# Patient Record
Sex: MALE | Race: WHITE | NOT HISPANIC OR LATINO | Employment: OTHER | URBAN - METROPOLITAN AREA
[De-identification: names, ages, dates, MRNs, and addresses within clinical notes are randomized per-mention and may not be internally consistent; named-entity substitution may affect disease eponyms.]

---

## 2017-12-20 ENCOUNTER — ESTABLISHED PATIENT (OUTPATIENT)
Dept: URBAN - METROPOLITAN AREA CLINIC 36 | Facility: CLINIC | Age: 72
End: 2017-12-20

## 2017-12-20 DIAGNOSIS — H35.413: ICD-10-CM

## 2017-12-20 DIAGNOSIS — H35.3130: ICD-10-CM

## 2017-12-20 PROCEDURE — 92014 COMPRE OPH EXAM EST PT 1/>: CPT

## 2017-12-20 PROCEDURE — G8427 DOCREV CUR MEDS BY ELIG CLIN: HCPCS

## 2017-12-20 PROCEDURE — 92134 CPTRZ OPH DX IMG PST SGM RTA: CPT

## 2017-12-20 PROCEDURE — 2019F DILATED MACUL EXAM DONE: CPT

## 2017-12-20 PROCEDURE — 1036F TOBACCO NON-USER: CPT

## 2017-12-20 PROCEDURE — 92242 FLUORESCEIN&ICG ANGIOGRAPHY: CPT

## 2017-12-20 PROCEDURE — 4177F TALK PT/CRGVR RE AREDS PREV: CPT

## 2017-12-20 ASSESSMENT — VISUAL ACUITY
OS_CC: 20/20
OD_CC: J1
OS_CC: J1+
OD_CC: 20/20-1

## 2017-12-20 ASSESSMENT — TONOMETRY
OD_IOP_MMHG: 17
OS_IOP_MMHG: 15

## 2018-12-03 ENCOUNTER — NEW PATIENT COMPREHENSIVE (OUTPATIENT)
Dept: URBAN - METROPOLITAN AREA CLINIC 36 | Facility: CLINIC | Age: 73
End: 2018-12-03

## 2018-12-03 VITALS — HEIGHT: 60 IN | DIASTOLIC BLOOD PRESSURE: 63 MMHG | HEART RATE: 65 BPM | SYSTOLIC BLOOD PRESSURE: 113 MMHG

## 2018-12-03 DIAGNOSIS — H43.813: ICD-10-CM

## 2018-12-03 DIAGNOSIS — H33.322: ICD-10-CM

## 2018-12-03 DIAGNOSIS — H35.3131: ICD-10-CM

## 2018-12-03 DIAGNOSIS — H35.413: ICD-10-CM

## 2018-12-03 PROCEDURE — 2019F DILATED MACUL EXAM DONE: CPT

## 2018-12-03 PROCEDURE — G9903 PT SCRN TBCO ID AS NON USER: HCPCS

## 2018-12-03 PROCEDURE — G8754 DIAS BP LESS 90: HCPCS

## 2018-12-03 PROCEDURE — 4177F TALK PT/CRGVR RE AREDS PREV: CPT

## 2018-12-03 PROCEDURE — 1036F TOBACCO NON-USER: CPT

## 2018-12-03 PROCEDURE — 92014 COMPRE OPH EXAM EST PT 1/>: CPT

## 2018-12-03 PROCEDURE — 92134 CPTRZ OPH DX IMG PST SGM RTA: CPT

## 2018-12-03 PROCEDURE — 9222550 BILAT EXTENDED OPHTHALMOSCOPY, FIRST

## 2018-12-03 PROCEDURE — G8427 DOCREV CUR MEDS BY ELIG CLIN: HCPCS

## 2018-12-03 PROCEDURE — G8752 SYS BP LESS 140: HCPCS

## 2018-12-03 ASSESSMENT — VISUAL ACUITY
OS_CC: 20/25-2
OD_CC: J1
OS_CC: J1
OD_CC: 20/25+2

## 2018-12-03 ASSESSMENT — TONOMETRY
OD_IOP_MMHG: 16
OS_IOP_MMHG: 16

## 2019-03-20 ENCOUNTER — APPOINTMENT (RX ONLY)
Dept: URBAN - METROPOLITAN AREA CLINIC 150 | Facility: CLINIC | Age: 74
Setting detail: DERMATOLOGY
End: 2019-03-20

## 2019-03-20 DIAGNOSIS — D22 MELANOCYTIC NEVI: ICD-10-CM

## 2019-03-20 DIAGNOSIS — L81.4 OTHER MELANIN HYPERPIGMENTATION: ICD-10-CM

## 2019-03-20 DIAGNOSIS — D18.0 HEMANGIOMA: ICD-10-CM

## 2019-03-20 DIAGNOSIS — L82.1 OTHER SEBORRHEIC KERATOSIS: ICD-10-CM

## 2019-03-20 DIAGNOSIS — L57.0 ACTINIC KERATOSIS: ICD-10-CM

## 2019-03-20 DIAGNOSIS — L28.0 LICHEN SIMPLEX CHRONICUS: ICD-10-CM

## 2019-03-20 PROBLEM — D48.5 NEOPLASM OF UNCERTAIN BEHAVIOR OF SKIN: Status: ACTIVE | Noted: 2019-03-20

## 2019-03-20 PROBLEM — Z85.828 PERSONAL HISTORY OF OTHER MALIGNANT NEOPLASM OF SKIN: Status: ACTIVE | Noted: 2019-03-20

## 2019-03-20 PROBLEM — Z85.820 PERSONAL HISTORY OF MALIGNANT MELANOMA OF SKIN: Status: ACTIVE | Noted: 2019-03-20

## 2019-03-20 PROBLEM — L23.7 ALLERGIC CONTACT DERMATITIS DUE TO PLANTS, EXCEPT FOOD: Status: ACTIVE | Noted: 2019-03-20

## 2019-03-20 PROBLEM — D18.01 HEMANGIOMA OF SKIN AND SUBCUTANEOUS TISSUE: Status: ACTIVE | Noted: 2019-03-20

## 2019-03-20 PROCEDURE — ? BIOPSY BY SHAVE METHOD

## 2019-03-20 PROCEDURE — ? COUNSELING

## 2019-03-20 PROCEDURE — 11102 TANGNTL BX SKIN SINGLE LES: CPT

## 2019-03-20 PROCEDURE — ? LIQUID NITROGEN

## 2019-03-20 PROCEDURE — 17003 DESTRUCT PREMALG LES 2-14: CPT

## 2019-03-20 PROCEDURE — ? PRESCRIPTION

## 2019-03-20 PROCEDURE — 99202 OFFICE O/P NEW SF 15 MIN: CPT | Mod: 25

## 2019-03-20 PROCEDURE — 17000 DESTRUCT PREMALG LESION: CPT | Mod: 59

## 2019-03-20 PROCEDURE — 11103 TANGNTL BX SKIN EA SEP/ADDL: CPT

## 2019-03-20 RX ORDER — CLOBETASOL PROPIONATE 0.5 MG/G
CREAM TOPICAL BID
Qty: 1 | Refills: 3 | Status: CANCELLED

## 2019-03-20 ASSESSMENT — LOCATION SIMPLE DESCRIPTION DERM
LOCATION SIMPLE: LEFT UPPER BACK
LOCATION SIMPLE: LEFT THIGH
LOCATION SIMPLE: RIGHT FOREARM
LOCATION SIMPLE: RIGHT UPPER ARM
LOCATION SIMPLE: LEFT CHEEK
LOCATION SIMPLE: RIGHT SHOULDER
LOCATION SIMPLE: RIGHT THIGH
LOCATION SIMPLE: LEFT UPPER ARM
LOCATION SIMPLE: RIGHT CHEEK
LOCATION SIMPLE: LEFT CALF
LOCATION SIMPLE: LEFT SHOULDER
LOCATION SIMPLE: LEFT FOREARM
LOCATION SIMPLE: RIGHT PRETIBIAL REGION
LOCATION SIMPLE: RIGHT HAND
LOCATION SIMPLE: LEFT HAND
LOCATION SIMPLE: CHEST
LOCATION SIMPLE: ABDOMEN
LOCATION SIMPLE: RIGHT CALF
LOCATION SIMPLE: LEFT POSTERIOR THIGH

## 2019-03-20 ASSESSMENT — LOCATION DETAILED DESCRIPTION DERM
LOCATION DETAILED: LEFT PROXIMAL POSTERIOR UPPER ARM
LOCATION DETAILED: RIGHT ANTERIOR DISTAL THIGH
LOCATION DETAILED: LEFT INFERIOR UPPER BACK
LOCATION DETAILED: LEFT SUPERIOR LATERAL BUCCAL CHEEK
LOCATION DETAILED: RIGHT POSTERIOR SHOULDER
LOCATION DETAILED: LEFT POSTERIOR SHOULDER
LOCATION DETAILED: LEFT ULNAR DORSAL HAND
LOCATION DETAILED: LEFT ANTERIOR DISTAL THIGH
LOCATION DETAILED: PERIUMBILICAL SKIN
LOCATION DETAILED: RIGHT PROXIMAL PRETIBIAL REGION
LOCATION DETAILED: RIGHT MEDIAL SUPERIOR CHEST
LOCATION DETAILED: RIGHT PROXIMAL POSTERIOR UPPER ARM
LOCATION DETAILED: RIGHT RADIAL DORSAL HAND
LOCATION DETAILED: RIGHT PROXIMAL CALF
LOCATION DETAILED: EPIGASTRIC SKIN
LOCATION DETAILED: RIGHT CENTRAL MALAR CHEEK
LOCATION DETAILED: RIGHT PROXIMAL DORSAL FOREARM
LOCATION DETAILED: LEFT DISTAL DORSAL FOREARM
LOCATION DETAILED: RIGHT INFERIOR CENTRAL MALAR CHEEK
LOCATION DETAILED: LEFT DISTAL POSTERIOR THIGH
LOCATION DETAILED: LEFT PROXIMAL CALF
LOCATION DETAILED: LEFT INFERIOR CENTRAL MALAR CHEEK

## 2019-03-20 ASSESSMENT — LOCATION ZONE DERM
LOCATION ZONE: ARM
LOCATION ZONE: HAND
LOCATION ZONE: TRUNK
LOCATION ZONE: LEG
LOCATION ZONE: FACE

## 2019-03-20 NOTE — PROCEDURE: BIOPSY BY SHAVE METHOD
Additional Anesthesia Volume In Cc (Will Not Render If 0): 0
Wound Care: Petrolatum
Electrodesiccation Text: The wound bed was treated with electrodesiccation after the biopsy was performed.
Depth Of Biopsy: dermis
Bill 57794 For Specimen Handling/Conveyance To Laboratory?: no
Biopsy Type: H and E
Type Of Destruction Used: Curettage
Electrodesiccation And Curettage Text: The wound bed was treated with electrodesiccation and curettage after the biopsy was performed.
Billing Type: Third-Party Bill
Post-Care Instructions: I reviewed with the patient in detail post-care instructions. Patient is to keep the biopsy site dry overnight, and then apply bacitracin twice daily until healed. Patient may apply hydrogen peroxide soaks to remove any crusting.
Anesthesia Volume In Cc (Will Not Render If 0): 0.5
Dressing: bandage
Was A Bandage Applied: Yes
Silver Nitrate Text: The wound bed was treated with silver nitrate after the biopsy was performed.
Notification Instructions: Patient will be notified of biopsy results. However, patient instructed to call the office if not contacted within 2 weeks.
Hemostasis: Drysol
Detail Level: Detailed
Curettage Text: The wound bed was treated with curettage after the biopsy was performed.
Biopsy Method: Dermablade
Lab: [id number]
Consent: Verbal consent was obtained and risks were reviewed including but not limited to scarring, infection, bleeding, scabbing, incomplete removal, nerve damage and allergy to anesthesia.
Cryotherapy Text: The wound bed was treated with cryotherapy after the biopsy was performed.
Anesthesia Type: 1% lidocaine with epinephrine

## 2019-03-25 ENCOUNTER — RX ONLY (OUTPATIENT)
Age: 74
Setting detail: RX ONLY
End: 2019-03-25

## 2019-03-25 RX ORDER — CLOBETASOL PROPIONATE 0.5 MG/G
CREAM TOPICAL
Qty: 1 | Refills: 2 | Status: ERX | COMMUNITY
Start: 2019-03-25

## 2019-04-17 ENCOUNTER — APPOINTMENT (RX ONLY)
Dept: URBAN - METROPOLITAN AREA CLINIC 150 | Facility: CLINIC | Age: 74
Setting detail: DERMATOLOGY
End: 2019-04-17

## 2019-04-17 PROBLEM — D04.62 CARCINOMA IN SITU OF SKIN OF LEFT UPPER LIMB, INCLUDING SHOULDER: Status: ACTIVE | Noted: 2019-04-17

## 2019-04-17 PROBLEM — C44.619 BASAL CELL CARCINOMA OF SKIN OF LEFT UPPER LIMB, INCLUDING SHOULDER: Status: ACTIVE | Noted: 2019-04-17

## 2019-04-17 PROBLEM — H91.90 UNSPECIFIED HEARING LOSS, UNSPECIFIED EAR: Status: ACTIVE | Noted: 2019-04-17

## 2019-04-17 PROCEDURE — ? PRESCRIPTION

## 2019-04-17 PROCEDURE — ? CURETTAGE AND DESTRUCTION

## 2019-04-17 PROCEDURE — 17261 DSTRJ MAL LES T/A/L .6-1.0CM: CPT

## 2019-04-17 PROCEDURE — 17262 DSTRJ MAL LES T/A/L 1.1-2.0: CPT

## 2019-04-17 PROCEDURE — ? COUNSELING

## 2019-04-17 RX ORDER — DOXYCYCLINE HYCLATE 100 MG/1
CAPSULE, GELATIN COATED ORAL BID
Qty: 10 | Refills: 0 | Status: ERX | COMMUNITY
Start: 2019-04-17

## 2019-04-17 RX ADMIN — DOXYCYCLINE HYCLATE: 100 CAPSULE, GELATIN COATED ORAL at 15:49

## 2019-05-16 ENCOUNTER — APPOINTMENT (RX ONLY)
Dept: URBAN - METROPOLITAN AREA CLINIC 151 | Facility: CLINIC | Age: 74
Setting detail: DERMATOLOGY
End: 2019-05-16

## 2019-05-16 DIAGNOSIS — Z86.007 PERSONAL HISTORY OF IN-SITU NEOPLASM OF SKIN: ICD-10-CM

## 2019-05-16 DIAGNOSIS — Z85.828 PERSONAL HISTORY OF OTHER MALIGNANT NEOPLASM OF SKIN: ICD-10-CM

## 2019-05-16 PROCEDURE — ? COUNSELING

## 2019-05-16 PROCEDURE — 99213 OFFICE O/P EST LOW 20 MIN: CPT

## 2019-05-16 ASSESSMENT — LOCATION SIMPLE DESCRIPTION DERM
LOCATION SIMPLE: LEFT UPPER ARM
LOCATION SIMPLE: LEFT FOREARM

## 2019-05-16 ASSESSMENT — LOCATION DETAILED DESCRIPTION DERM
LOCATION DETAILED: LEFT LATERAL PROXIMAL UPPER ARM
LOCATION DETAILED: LEFT PROXIMAL DORSAL FOREARM

## 2019-05-16 ASSESSMENT — LOCATION ZONE DERM
LOCATION ZONE: ARM
LOCATION ZONE: ARM

## 2019-07-25 NOTE — PATIENT DISCUSSION
Recommended excision/biopsy due to suspicious appearance.  Patient desires to proceed. Photos taken.  Consent form signed. We will call with path.

## 2019-12-02 ENCOUNTER — ESTABLISHED PATIENT (OUTPATIENT)
Dept: URBAN - METROPOLITAN AREA CLINIC 36 | Facility: CLINIC | Age: 74
End: 2019-12-02

## 2019-12-02 DIAGNOSIS — H35.3131: ICD-10-CM

## 2019-12-02 DIAGNOSIS — H35.363: ICD-10-CM

## 2019-12-02 DIAGNOSIS — H43.813: ICD-10-CM

## 2019-12-02 DIAGNOSIS — H33.322: ICD-10-CM

## 2019-12-02 DIAGNOSIS — H35.413: ICD-10-CM

## 2019-12-02 PROCEDURE — 92014 COMPRE OPH EXAM EST PT 1/>: CPT

## 2019-12-02 PROCEDURE — 92134 CPTRZ OPH DX IMG PST SGM RTA: CPT

## 2019-12-02 PROCEDURE — 9222650 BILAT EXTENDED OPHTHALMOSCOPY, F/U

## 2019-12-02 ASSESSMENT — VISUAL ACUITY
OS_CC: 20/30-2+2
OD_CC: 20/25-2

## 2019-12-02 ASSESSMENT — TONOMETRY
OS_IOP_MMHG: 16
OD_IOP_MMHG: 15

## 2020-10-01 ENCOUNTER — OFFICE VISIT (OUTPATIENT)
Age: 75
End: 2020-10-01

## 2020-10-19 ENCOUNTER — ESTABLISHED COMPREHENSIVE EXAM (OUTPATIENT)
Dept: URBAN - METROPOLITAN AREA CLINIC 36 | Facility: CLINIC | Age: 75
End: 2020-10-19

## 2020-10-19 VITALS — HEIGHT: 60 IN

## 2020-10-19 DIAGNOSIS — H25.813: ICD-10-CM

## 2020-10-19 DIAGNOSIS — H33.322: ICD-10-CM

## 2020-10-19 DIAGNOSIS — H43.813: ICD-10-CM

## 2020-10-19 DIAGNOSIS — H35.413: ICD-10-CM

## 2020-10-19 DIAGNOSIS — H35.363: ICD-10-CM

## 2020-10-19 DIAGNOSIS — H35.3131: ICD-10-CM

## 2020-10-19 PROCEDURE — 92202 OPSCPY EXTND ON/MAC DRAW: CPT

## 2020-10-19 PROCEDURE — 92014 COMPRE OPH EXAM EST PT 1/>: CPT

## 2020-10-19 PROCEDURE — 92134 CPTRZ OPH DX IMG PST SGM RTA: CPT

## 2020-10-19 ASSESSMENT — VISUAL ACUITY
OS_CC: 20/50+1
OS_PH: 20/40
OS_CC: J3
OD_CC: 20/25+2
OD_CC: J1

## 2020-10-19 ASSESSMENT — TONOMETRY
OD_IOP_MMHG: 16
OS_IOP_MMHG: 17

## 2021-01-01 ENCOUNTER — OFFICE VISIT (OUTPATIENT)
Age: 76
End: 2021-01-01

## 2021-03-30 ENCOUNTER — OFFICE VISIT (OUTPATIENT)
Dept: URBAN - METROPOLITAN AREA CLINIC 7 | Facility: CLINIC | Age: 76
End: 2021-03-30

## 2021-04-07 ENCOUNTER — TELEPHONE ENCOUNTER (OUTPATIENT)
Dept: URBAN - METROPOLITAN AREA CLINIC 9 | Facility: CLINIC | Age: 76
End: 2021-04-07

## 2021-04-19 ENCOUNTER — ESTABLISHED COMPREHENSIVE EXAM (OUTPATIENT)
Dept: URBAN - METROPOLITAN AREA CLINIC 36 | Facility: CLINIC | Age: 76
End: 2021-04-19

## 2021-04-19 DIAGNOSIS — H25.813: ICD-10-CM

## 2021-04-19 DIAGNOSIS — H35.3131: ICD-10-CM

## 2021-04-19 DIAGNOSIS — H43.813: ICD-10-CM

## 2021-04-19 DIAGNOSIS — H35.363: ICD-10-CM

## 2021-04-19 DIAGNOSIS — H33.322: ICD-10-CM

## 2021-04-19 DIAGNOSIS — H35.413: ICD-10-CM

## 2021-04-19 PROCEDURE — 92202 OPSCPY EXTND ON/MAC DRAW: CPT

## 2021-04-19 PROCEDURE — 92012 INTRM OPH EXAM EST PATIENT: CPT

## 2021-04-19 ASSESSMENT — VISUAL ACUITY
OS_CC: 20/50+2
OS_PH: 20/30
OD_CC: 20/20-2

## 2021-04-19 ASSESSMENT — TONOMETRY
OD_IOP_MMHG: 11
OS_IOP_MMHG: 11

## 2021-05-13 ENCOUNTER — TELEPHONE ENCOUNTER (OUTPATIENT)
Dept: URBAN - METROPOLITAN AREA CLINIC 9 | Facility: CLINIC | Age: 76
End: 2021-05-13

## 2021-06-21 ENCOUNTER — CATARACT CONSULT (OUTPATIENT)
Dept: URBAN - METROPOLITAN AREA CLINIC 43 | Facility: CLINIC | Age: 76
End: 2021-06-21

## 2021-06-21 DIAGNOSIS — H25.811: ICD-10-CM

## 2021-06-21 DIAGNOSIS — H35.3131: ICD-10-CM

## 2021-06-21 DIAGNOSIS — H35.363: ICD-10-CM

## 2021-06-21 DIAGNOSIS — H25.812: ICD-10-CM

## 2021-06-21 DIAGNOSIS — H35.413: ICD-10-CM

## 2021-06-21 DIAGNOSIS — H43.813: ICD-10-CM

## 2021-06-21 DIAGNOSIS — H33.322: ICD-10-CM

## 2021-06-21 PROCEDURE — 92025-1 CORNEAL TOPOGRAPHY, INS

## 2021-06-21 PROCEDURE — 92286 ANT SGM IMG I&R SPECLR MIC: CPT

## 2021-06-21 PROCEDURE — 92134 CPTRZ OPH DX IMG PST SGM RTA: CPT

## 2021-06-21 PROCEDURE — V2799PMN IMPRIMIS PRED-MOXI-NEPAF 5ML

## 2021-06-21 PROCEDURE — 92136TC INTERFEROMETRY - TECHNICAL COMPONENT

## 2021-06-21 PROCEDURE — 92014 COMPRE OPH EXAM EST PT 1/>: CPT

## 2021-06-21 PROCEDURE — 92250 FUNDUS PHOTOGRAPHY W/I&R: CPT

## 2021-06-21 ASSESSMENT — VISUAL ACUITY
OS_BAT: 20/400
OD_SC: 20/200
OS_CC: J2
OS_SC: J2
OS_SC: 20/400
OD_CC: 20/25-2
OD_BAT: 20/200
OS_CC: 20/60-2
OS_PH: 20/60
OD_CC: J1
OD_SC: J1

## 2021-06-21 ASSESSMENT — TONOMETRY
OS_IOP_MMHG: 20
OD_IOP_MMHG: 14

## 2021-07-15 ENCOUNTER — SURGERY/PROCEDURE (OUTPATIENT)
Dept: URBAN - METROPOLITAN AREA CLINIC 43 | Facility: CLINIC | Age: 76
End: 2021-07-15

## 2021-07-15 ENCOUNTER — ESTABLISHED PATIENT (OUTPATIENT)
Dept: URBAN - METROPOLITAN AREA SURGERY 14 | Facility: SURGERY | Age: 76
End: 2021-07-15

## 2021-07-15 DIAGNOSIS — H35.363: ICD-10-CM

## 2021-07-15 DIAGNOSIS — H35.3131: ICD-10-CM

## 2021-07-15 DIAGNOSIS — H25.812: ICD-10-CM

## 2021-07-15 DIAGNOSIS — H35.413: ICD-10-CM

## 2021-07-15 DIAGNOSIS — H43.813: ICD-10-CM

## 2021-07-15 DIAGNOSIS — H33.322: ICD-10-CM

## 2021-07-15 DIAGNOSIS — H25.811: ICD-10-CM

## 2021-07-15 PROCEDURE — 66999LNSR LENSAR LASER FOR CAT SX

## 2021-07-15 PROCEDURE — 99211HP H&P OFFICE/OUTPATIENT VISIT, EST

## 2021-07-15 PROCEDURE — 66984CV REMOVE CATARACT, INSERT LENS, CUSTOM VISION

## 2021-07-16 ENCOUNTER — CATARACT POST-OP 1-DAY (OUTPATIENT)
Dept: URBAN - METROPOLITAN AREA CLINIC 36 | Facility: CLINIC | Age: 76
End: 2021-07-16

## 2021-07-16 DIAGNOSIS — Z96.1: ICD-10-CM

## 2021-07-16 PROCEDURE — 99024 POSTOP FOLLOW-UP VISIT: CPT

## 2021-07-16 ASSESSMENT — VISUAL ACUITY
OD_CC: 20/20-2
OS_SC: 20/50-2

## 2021-07-16 ASSESSMENT — TONOMETRY: OS_IOP_MMHG: 22

## 2021-07-23 ENCOUNTER — POST-OP (OUTPATIENT)
Dept: URBAN - METROPOLITAN AREA CLINIC 36 | Facility: CLINIC | Age: 76
End: 2021-07-23

## 2021-07-23 DIAGNOSIS — Z96.1: ICD-10-CM

## 2021-07-23 PROCEDURE — 99024 POSTOP FOLLOW-UP VISIT: CPT

## 2021-07-23 ASSESSMENT — VISUAL ACUITY
OS_SC: 20/30
OS_SC: J12
OD_SC: 20/200
OD_CC: J1

## 2021-09-03 ENCOUNTER — POST-OP (OUTPATIENT)
Dept: URBAN - METROPOLITAN AREA CLINIC 36 | Facility: CLINIC | Age: 76
End: 2021-09-03

## 2021-09-03 DIAGNOSIS — H35.413: ICD-10-CM

## 2021-09-03 DIAGNOSIS — H25.811: ICD-10-CM

## 2021-09-03 DIAGNOSIS — H33.322: ICD-10-CM

## 2021-09-03 DIAGNOSIS — H25.812: ICD-10-CM

## 2021-09-03 DIAGNOSIS — Z96.1: ICD-10-CM

## 2021-09-03 DIAGNOSIS — H43.813: ICD-10-CM

## 2021-09-03 DIAGNOSIS — H35.3131: ICD-10-CM

## 2021-09-03 DIAGNOSIS — H35.363: ICD-10-CM

## 2022-03-28 ENCOUNTER — COMPREHENSIVE EXAM (OUTPATIENT)
Dept: URBAN - METROPOLITAN AREA CLINIC 36 | Facility: CLINIC | Age: 77
End: 2022-03-28

## 2022-03-28 DIAGNOSIS — H43.813: ICD-10-CM

## 2022-03-28 DIAGNOSIS — H35.363: ICD-10-CM

## 2022-03-28 DIAGNOSIS — H35.413: ICD-10-CM

## 2022-03-28 DIAGNOSIS — H35.3131: ICD-10-CM

## 2022-03-28 DIAGNOSIS — H25.811: ICD-10-CM

## 2022-03-28 DIAGNOSIS — H33.322: ICD-10-CM

## 2022-03-28 PROCEDURE — 92134 CPTRZ OPH DX IMG PST SGM RTA: CPT

## 2022-03-28 PROCEDURE — 92202 OPSCPY EXTND ON/MAC DRAW: CPT

## 2022-03-28 PROCEDURE — 92014 COMPRE OPH EXAM EST PT 1/>: CPT

## 2022-03-28 ASSESSMENT — VISUAL ACUITY
OD_CC: J1
OS_CC: J2
OS_CC: 20/30-2
OD_CC: 20/20-2

## 2022-03-28 ASSESSMENT — TONOMETRY
OS_IOP_MMHG: 20
OD_IOP_MMHG: 19

## 2022-07-30 ENCOUNTER — TELEPHONE ENCOUNTER (OUTPATIENT)
Age: 77
End: 2022-07-30

## 2022-07-30 RX ORDER — NEOMYCIN SULFATE 500 MG
1 (ONE) TABLET ORAL
Qty: 0 | Refills: 2 | OUTPATIENT
Start: 2021-05-11 | End: 2021-05-25

## 2022-07-31 ENCOUNTER — TELEPHONE ENCOUNTER (OUTPATIENT)
Age: 77
End: 2022-07-31

## 2022-07-31 RX ORDER — NEOMYCIN SULFATE 500 MG
1 (ONE) TABLET ORAL
Qty: 0 | Refills: 2 | Status: ACTIVE | COMMUNITY
Start: 2021-05-11

## 2022-12-27 NOTE — PATIENT DISCUSSION
"""Annual Type 2 Diabetic eye exam with dilation. No diabetic retinopathy found. Recommend annual dilated examinations. Patient instructed to call office immediately if sudden changes in vision occur. Emphasized importance of good blood glucose control. Summary of care provided to the physician managing the ongoing diabetes care. """ Surgery

## 2023-04-06 ENCOUNTER — EMERGENCY VISIT (OUTPATIENT)
Dept: URBAN - METROPOLITAN AREA CLINIC 36 | Facility: CLINIC | Age: 78
End: 2023-04-06

## 2023-04-06 DIAGNOSIS — H01.02A: ICD-10-CM

## 2023-04-06 DIAGNOSIS — H01.02B: ICD-10-CM

## 2023-04-06 DIAGNOSIS — H11.31: ICD-10-CM

## 2023-04-06 PROCEDURE — 92012 INTRM OPH EXAM EST PATIENT: CPT

## 2023-04-06 ASSESSMENT — TONOMETRY
OD_IOP_MMHG: 14
OS_IOP_MMHG: 16

## 2023-04-06 ASSESSMENT — VISUAL ACUITY
OD_CC: 20/30
OS_CC: 20/25

## 2023-05-10 ENCOUNTER — PREPPED CHART (OUTPATIENT)
Dept: URBAN - METROPOLITAN AREA CLINIC 36 | Facility: CLINIC | Age: 78
End: 2023-05-10

## 2023-10-12 ENCOUNTER — COMPREHENSIVE EXAM (OUTPATIENT)
Dept: URBAN - METROPOLITAN AREA CLINIC 36 | Facility: CLINIC | Age: 78
End: 2023-10-12

## 2023-10-12 DIAGNOSIS — H35.3121: ICD-10-CM

## 2023-10-12 DIAGNOSIS — H33.322: ICD-10-CM

## 2023-10-12 DIAGNOSIS — H25.811: ICD-10-CM

## 2023-10-12 DIAGNOSIS — H35.3211: ICD-10-CM

## 2023-10-12 DIAGNOSIS — H04.123: ICD-10-CM

## 2023-10-12 PROCEDURE — 92015 DETERMINE REFRACTIVE STATE: CPT

## 2023-10-12 PROCEDURE — 92134 CPTRZ OPH DX IMG PST SGM RTA: CPT

## 2023-10-12 PROCEDURE — 92014 COMPRE OPH EXAM EST PT 1/>: CPT

## 2023-10-12 ASSESSMENT — TONOMETRY
OD_IOP_MMHG: 18
OS_IOP_MMHG: 17

## 2023-10-12 ASSESSMENT — VISUAL ACUITY
OS_SC: 20/25-1
OS_CC: 20/25-2
OD_CC: 20/30-2
OD_SC: J1+
OS_SC: >J10
OD_SC: 20/400

## 2024-10-21 ENCOUNTER — COMPREHENSIVE EXAM (OUTPATIENT)
Dept: URBAN - METROPOLITAN AREA CLINIC 36 | Facility: CLINIC | Age: 79
End: 2024-10-21

## 2024-10-21 DIAGNOSIS — H04.123: ICD-10-CM

## 2024-10-21 DIAGNOSIS — H25.811: ICD-10-CM

## 2024-10-21 DIAGNOSIS — H33.322: ICD-10-CM

## 2024-10-21 DIAGNOSIS — H35.3121: ICD-10-CM

## 2024-10-21 DIAGNOSIS — H52.7: ICD-10-CM

## 2024-10-21 DIAGNOSIS — H35.3211: ICD-10-CM

## 2024-10-21 PROCEDURE — 92012 INTRM OPH EXAM EST PATIENT: CPT

## 2024-10-21 PROCEDURE — 92015 DETERMINE REFRACTIVE STATE: CPT

## (undated) LAB
AMYLASE: (no result)
ASSAY, ELASTASE, PANCREATIC, FECAL: (no result)
LIPASE: (no result)
Lab: (no result)
Lab: (no result)
TRIGLYCERIDES: (no result)